# Patient Record
Sex: MALE | Race: OTHER | Employment: FULL TIME | URBAN - METROPOLITAN AREA
[De-identification: names, ages, dates, MRNs, and addresses within clinical notes are randomized per-mention and may not be internally consistent; named-entity substitution may affect disease eponyms.]

---

## 2017-02-04 ENCOUNTER — APPOINTMENT (OUTPATIENT)
Dept: OCCUPATIONAL MEDICINE | Age: 32
End: 2017-02-04
Attending: EMERGENCY MEDICINE

## 2019-10-31 ENCOUNTER — HOSPITAL ENCOUNTER (OUTPATIENT)
Age: 34
Discharge: HOME | End: 2019-10-31
Attending: EMERGENCY MEDICINE
Payer: COMMERCIAL

## 2019-10-31 VITALS
OXYGEN SATURATION: 100 % | HEART RATE: 98 BPM | DIASTOLIC BLOOD PRESSURE: 74 MMHG | RESPIRATION RATE: 18 BRPM | TEMPERATURE: 98 F | WEIGHT: 310 LBS | SYSTOLIC BLOOD PRESSURE: 138 MMHG

## 2019-10-31 DIAGNOSIS — G51.0 BELL'S PALSY: Primary | ICD-10-CM

## 2019-10-31 PROCEDURE — 99204 OFFICE O/P NEW MOD 45 MIN: CPT

## 2019-10-31 PROCEDURE — 36415 COLL VENOUS BLD VENIPUNCTURE: CPT

## 2019-10-31 PROCEDURE — 99203 OFFICE O/P NEW LOW 30 MIN: CPT

## 2019-10-31 PROCEDURE — 86618 LYME DISEASE ANTIBODY: CPT | Performed by: EMERGENCY MEDICINE

## 2019-10-31 RX ORDER — VALACYCLOVIR HYDROCHLORIDE 1 G/1
1 TABLET, FILM COATED ORAL 3 TIMES DAILY
Qty: 21 TABLET | Refills: 0 | Status: SHIPPED | OUTPATIENT
Start: 2019-10-31 | End: 2019-11-07

## 2019-10-31 RX ORDER — PREDNISONE 10 MG/1
TABLET ORAL
Qty: 45 TABLET | Refills: 0 | Status: SHIPPED | OUTPATIENT
Start: 2019-10-31

## 2019-10-31 NOTE — ED PROVIDER NOTES
[facility]      History   Patient presents with:  Numbness Weakness (neurologic)    Stated Complaint: right side going numb    HPI    Patient is a 28-year-old male without significant past medical history chest  No bruits  CV: Regular rate and rhythm no murmur  Respiratory: Clear to auscultation bilaterally  Skin: No acute rash  Neuro: Alert and oriented x3   cranial nerves:  There is a right-sided facial droop involving the forehead  Strength 5/5 bilaterall

## 2019-10-31 NOTE — ED INITIAL ASSESSMENT (HPI)
r sided facial numbness since this am, speech clear, no facial droop, equal hand grasps , no drooling, no fever

## 2019-11-06 ENCOUNTER — OFFICE VISIT (OUTPATIENT)
Dept: FAMILY MEDICINE CLINIC | Facility: CLINIC | Age: 34
End: 2019-11-06

## 2019-11-06 VITALS
BODY MASS INDEX: 47.74 KG/M2 | TEMPERATURE: 98 F | SYSTOLIC BLOOD PRESSURE: 117 MMHG | DIASTOLIC BLOOD PRESSURE: 75 MMHG | HEIGHT: 68 IN | HEART RATE: 79 BPM | WEIGHT: 315 LBS

## 2019-11-06 DIAGNOSIS — G51.0 BELL'S PALSY: Primary | ICD-10-CM

## 2019-11-06 PROCEDURE — 99203 OFFICE O/P NEW LOW 30 MIN: CPT | Performed by: FAMILY MEDICINE

## 2019-11-06 NOTE — PROGRESS NOTES
11/6/2019  12:59 PM    Jorge Ryan is a 30year old male. Chief complaint(s): Patient presents with:  Urgent Care F/u: f/u on bells palsy  x1 week, right watery eye, diffiulty eating and tlking at times.  Has been doing home massages     HPI: Psychiatric/Behavioral: Negative for depressed mood. PHYSICAL EXAM:   Physical Exam    Constitutional: He appears well-developed and well-nourished.    /75 (BP Location: Right arm, Patient Position: Sitting, Cuff Size: adult)   Pulse 79   Temp

## 2019-11-13 ENCOUNTER — TELEPHONE (OUTPATIENT)
Dept: PHYSICAL THERAPY | Facility: HOSPITAL | Age: 34
End: 2019-11-13

## 2019-11-21 ENCOUNTER — OFFICE VISIT (OUTPATIENT)
Dept: PHYSICAL THERAPY | Facility: HOSPITAL | Age: 34
End: 2019-11-21
Attending: FAMILY MEDICINE
Payer: COMMERCIAL

## 2019-11-21 PROCEDURE — 97112 NEUROMUSCULAR REEDUCATION: CPT

## 2019-11-21 PROCEDURE — 97161 PT EVAL LOW COMPLEX 20 MIN: CPT

## 2019-11-21 NOTE — PROGRESS NOTES
NEUROLOGICAL PHYSICAL THERAPY EVALUATION:   Referring Physician: Dr. Yas Greer  Diagnosis: Bell's Palsy      Date of Onset: per HPI Date of Service: 11/21/2019     PATIENT SUMMARY   Jorge Louis is a 30year old y/o male who presents to therapy t eyebrows  __10____  Bring eyebrows down and together __90__  Gently close eyes___95__  Squint your lower eyelids up_10___  Close eyes tightly__60__  Smile evenly with your lips together_trace___  Big wide smile with lips apart_trace___  Raise upper lip whi Cj PT, NCS      Electronically signed by therapist: Chester Hamilton PT, TIMOTHY    [de-identified] certification required: Yes  I certify the need for these services furnished under this plan of treatment and while under my care.     X_______________________

## 2019-11-25 ENCOUNTER — OFFICE VISIT (OUTPATIENT)
Dept: PHYSICAL THERAPY | Facility: HOSPITAL | Age: 34
End: 2019-11-25
Attending: FAMILY MEDICINE
Payer: COMMERCIAL

## 2019-11-25 PROCEDURE — 97112 NEUROMUSCULAR REEDUCATION: CPT

## 2019-11-25 NOTE — PROGRESS NOTES
Diagnosis: Bell's Palsy  Visit (# authorized):                          Referring Physician: Dr. Nena Stallings    Precautions: none    Medication changes since last visit?:  No    Subjective: Pt reports noting improvements in speech. Mild improvement in smile.

## 2019-12-10 ENCOUNTER — OFFICE VISIT (OUTPATIENT)
Dept: PHYSICAL THERAPY | Facility: HOSPITAL | Age: 34
End: 2019-12-10
Attending: FAMILY MEDICINE
Payer: COMMERCIAL

## 2019-12-10 DIAGNOSIS — G51.0 BELL'S PALSY: ICD-10-CM

## 2019-12-10 PROCEDURE — 97112 NEUROMUSCULAR REEDUCATION: CPT

## 2019-12-10 NOTE — PROGRESS NOTES
Diagnosis: Bell's Palsy  Visit (# authorized):   6 per POC [facility])                       Referring Physician: Dr. Gemma Russo    Precautions: none    Medication changes since last visit?:  No    Subjective: Improvements in \"P\" sound and squinting.  Tanner

## 2019-12-17 ENCOUNTER — APPOINTMENT (OUTPATIENT)
Dept: PHYSICAL THERAPY | Facility: HOSPITAL | Age: 34
End: 2019-12-17
Attending: FAMILY MEDICINE
Payer: COMMERCIAL

## 2020-01-02 ENCOUNTER — OFFICE VISIT (OUTPATIENT)
Dept: PHYSICAL THERAPY | Facility: HOSPITAL | Age: 35
End: 2020-01-02
Attending: FAMILY MEDICINE
Payer: COMMERCIAL

## 2020-01-02 DIAGNOSIS — G51.0 BELL'S PALSY: ICD-10-CM

## 2020-01-02 PROCEDURE — 97112 NEUROMUSCULAR REEDUCATION: CPT

## 2020-01-02 NOTE — PROGRESS NOTES
Discharge Summary    Referring Physician: Dr. Tadeo Wen    Diagnosis: Cassandra Hartmann has attended 4 visits in Physical Therapy. Subjective: Pt reports minimal tightness over top lip noted with smiling primarily.  Weakness also experienced with swishi options and has agreed to actively participate in planning and for this course of care. Thank you for your referral. If you have any questions, please contact me at Dept: [phone].     Sincerely,    Shama Hunter PT, NCS    Electronically signed by

## 2020-01-07 ENCOUNTER — APPOINTMENT (OUTPATIENT)
Dept: PHYSICAL THERAPY | Facility: HOSPITAL | Age: 35
End: 2020-01-07
Attending: FAMILY MEDICINE
Payer: COMMERCIAL

## 2020-01-14 ENCOUNTER — APPOINTMENT (OUTPATIENT)
Dept: PHYSICAL THERAPY | Facility: HOSPITAL | Age: 35
End: 2020-01-14
Attending: FAMILY MEDICINE
Payer: COMMERCIAL

## 2020-01-21 ENCOUNTER — APPOINTMENT (OUTPATIENT)
Dept: PHYSICAL THERAPY | Facility: HOSPITAL | Age: 35
End: 2020-01-21
Attending: FAMILY MEDICINE
Payer: COMMERCIAL

## 2020-01-28 ENCOUNTER — APPOINTMENT (OUTPATIENT)
Dept: PHYSICAL THERAPY | Facility: HOSPITAL | Age: 35
End: 2020-01-28
Attending: FAMILY MEDICINE
Payer: COMMERCIAL

## 2020-07-23 ENCOUNTER — PATIENT MESSAGE (OUTPATIENT)
Dept: FAMILY MEDICINE CLINIC | Facility: CLINIC | Age: 35
End: 2020-07-23

## 2020-07-23 ENCOUNTER — NURSE TRIAGE (OUTPATIENT)
Dept: FAMILY MEDICINE CLINIC | Facility: CLINIC | Age: 35
End: 2020-07-23

## 2020-07-23 NOTE — TELEPHONE ENCOUNTER
Action Requested: Summary for Provider     []  Critical Lab, Recommendations Needed  [] Need Additional Advice  []   FYI    []   Need Orders  [] Need Medications Sent to Pharmacy  []  Other     SUMMARY:     Spoke to patient.  He denies close contact with in

## 2020-07-23 NOTE — TELEPHONE ENCOUNTER
Lisbeth Álvarez MD 58 minutes ago (1:38 PM)         Hi Doctor. My names Mark Jameson, and I was suppose to meet with you earlier last year.  About my bells palsy.  I think at the time you weren't available so a different docto

## 2020-07-23 NOTE — TELEPHONE ENCOUNTER
From: Josette Rowan  To: Rona Lakhani MD  Sent: 7/23/2020 1:38 PM CDT  Subject: Other    Hi Doctor. My names Diandra Beyer, and I was suppose to meet with you earlier last year. About my bells palsy.  I think at the time you weren't availabl

## 2020-07-24 ENCOUNTER — LAB ENCOUNTER (OUTPATIENT)
Dept: LAB | Facility: HOSPITAL | Age: 35
End: 2020-07-24
Attending: FAMILY MEDICINE
Payer: COMMERCIAL

## 2020-07-24 ENCOUNTER — TELEMEDICINE (OUTPATIENT)
Dept: FAMILY MEDICINE CLINIC | Facility: CLINIC | Age: 35
End: 2020-07-24

## 2020-07-24 DIAGNOSIS — Z20.822 SUSPECTED COVID-19 VIRUS INFECTION: Primary | ICD-10-CM

## 2020-07-24 DIAGNOSIS — Z20.822 SUSPECTED COVID-19 VIRUS INFECTION: ICD-10-CM

## 2020-07-24 PROCEDURE — 99213 OFFICE O/P EST LOW 20 MIN: CPT | Performed by: FAMILY MEDICINE

## 2020-07-24 NOTE — PROGRESS NOTES
Virtual Telephone Check-In    Jorge Desai verbally consents to a Virtual/Telephone Check-In service on 07/24/20.     Patient understands and accepts financial responsibility for any deductible, co-insurance and/or co-pays associated with this ser

## 2020-07-25 LAB — SARS-COV-2 RNA RESP QL NAA+PROBE: NOT DETECTED

## 2020-12-21 ENCOUNTER — PATIENT MESSAGE (OUTPATIENT)
Dept: FAMILY MEDICINE CLINIC | Facility: CLINIC | Age: 35
End: 2020-12-21

## 2020-12-21 DIAGNOSIS — Z20.822 SUSPECTED COVID-19 VIRUS INFECTION: Primary | ICD-10-CM

## 2020-12-21 NOTE — TELEPHONE ENCOUNTER
Dr Reza Lung below, pended [facility]. From: Jorge Christianson  To: Rosaura Boo MD  Sent: 12/21/2020  9:53 AM CST  Subject: Referral Request    Hi Dr. Yoana Rick.       Hope you and your family are doing well, quick question, is there is any chance I coul

## 2020-12-22 ENCOUNTER — LAB ENCOUNTER (OUTPATIENT)
Dept: LAB | Age: 35
End: 2020-12-22
Attending: FAMILY MEDICINE
Payer: COMMERCIAL

## 2020-12-22 DIAGNOSIS — Z20.822 SUSPECTED COVID-19 VIRUS INFECTION: ICD-10-CM

## 2021-04-01 ENCOUNTER — OFFICE VISIT (OUTPATIENT)
Dept: FAMILY MEDICINE CLINIC | Facility: CLINIC | Age: 36
End: 2021-04-01

## 2021-04-01 VITALS
HEIGHT: 68 IN | DIASTOLIC BLOOD PRESSURE: 84 MMHG | WEIGHT: 315 LBS | BODY MASS INDEX: 47.74 KG/M2 | HEART RATE: 90 BPM | TEMPERATURE: 98 F | SYSTOLIC BLOOD PRESSURE: 152 MMHG

## 2021-04-01 DIAGNOSIS — R03.0 ELEVATED BLOOD PRESSURE READING: ICD-10-CM

## 2021-04-01 DIAGNOSIS — Z00.00 ANNUAL PHYSICAL EXAM: Primary | ICD-10-CM

## 2021-04-01 PROCEDURE — 3008F BODY MASS INDEX DOCD: CPT | Performed by: FAMILY MEDICINE

## 2021-04-01 PROCEDURE — 99395 PREV VISIT EST AGE 18-39: CPT | Performed by: FAMILY MEDICINE

## 2021-04-01 PROCEDURE — 3077F SYST BP >= 140 MM HG: CPT | Performed by: FAMILY MEDICINE

## 2021-04-01 PROCEDURE — 3079F DIAST BP 80-89 MM HG: CPT | Performed by: FAMILY MEDICINE

## 2021-04-01 NOTE — PROGRESS NOTES
Denece Orf is a 37year old male who presents for a complete physical exam.   HPI:   Work: Working in Customer service and as a realtor.    Social: Lives with wife and kids   Diet: eats home cooked meals and take-out food usually fast food when Temp 97.9 °F (36.6 °C) (Tympanic)   Ht 5' 8\" (1.727 m)   Wt (!) 332 lb (150.6 kg)   BMI 50.48 kg/m²     GENERAL: well developed, well nourished, in no apparent distress  SKIN: no rashes, no suspicious lesions  HEENT: atraumatic, normocephalic, ears with

## 2021-04-05 ENCOUNTER — LAB ENCOUNTER (OUTPATIENT)
Dept: LAB | Age: 36
End: 2021-04-05
Attending: FAMILY MEDICINE
Payer: COMMERCIAL

## 2021-04-05 DIAGNOSIS — Z00.00 ANNUAL PHYSICAL EXAM: ICD-10-CM

## 2021-04-05 PROCEDURE — 85025 COMPLETE CBC W/AUTO DIFF WBC: CPT

## 2021-04-05 PROCEDURE — 36415 COLL VENOUS BLD VENIPUNCTURE: CPT

## 2021-04-05 PROCEDURE — 83036 HEMOGLOBIN GLYCOSYLATED A1C: CPT

## 2021-04-05 PROCEDURE — 84443 ASSAY THYROID STIM HORMONE: CPT

## 2021-04-05 PROCEDURE — 80061 LIPID PANEL: CPT

## 2021-04-05 PROCEDURE — 80053 COMPREHEN METABOLIC PANEL: CPT

## 2021-04-05 PROCEDURE — 84439 ASSAY OF FREE THYROXINE: CPT

## 2021-04-05 PROCEDURE — 82306 VITAMIN D 25 HYDROXY: CPT

## 2021-04-09 DIAGNOSIS — Z23 NEED FOR VACCINATION: ICD-10-CM

## 2021-04-12 PROBLEM — E78.1 HYPERTRIGLYCERIDEMIA: Status: ACTIVE | Noted: 2021-04-12

## 2021-04-12 PROBLEM — E55.9 VITAMIN D DEFICIENCY: Status: ACTIVE | Noted: 2021-04-12

## 2021-04-12 PROBLEM — R74.01 ELEVATED TRANSAMINASE LEVEL: Status: ACTIVE | Noted: 2021-04-12

## 2021-04-12 PROBLEM — R79.89 ELEVATED TSH: Status: ACTIVE | Noted: 2021-04-12

## 2021-04-12 PROBLEM — E11.65 TYPE 2 DIABETES MELLITUS WITH HYPERGLYCEMIA, WITHOUT LONG-TERM CURRENT USE OF INSULIN (HCC): Status: ACTIVE | Noted: 2021-04-12

## 2023-02-01 ENCOUNTER — TELEPHONE (OUTPATIENT)
Dept: FAMILY MEDICINE CLINIC | Facility: CLINIC | Age: 38
End: 2023-02-01

## 2023-04-03 ENCOUNTER — TELEPHONE (OUTPATIENT)
Dept: FAMILY MEDICINE CLINIC | Facility: CLINIC | Age: 38
End: 2023-04-03

## 2023-04-03 NOTE — TELEPHONE ENCOUNTER
Patient sees Dr. Mala Howard as PCP, but Dr. Leon Varela is listed as PCP. Please advise on eligibility for PCP change. No active insurance listed.

## 2023-04-04 ENCOUNTER — PATIENT OUTREACH (OUTPATIENT)
Dept: CASE MANAGEMENT | Age: 38
End: 2023-04-04

## 2023-04-04 NOTE — PROCEDURES
The office order for PCP removal request is Approved and finalized on April 4, 2023. [facility] patient assigned to Dr. Amanda Rae. Updated PCP to reflect assigned [facility] PCP.     Thanks,  [facility] [employer] Cimetidine Counseling:  I discussed with the patient the risks of Cimetidine including but not limited to gynecomastia, headache, diarrhea, nausea, drowsiness, arrhythmias, pancreatitis, skin rashes, psychosis, bone marrow suppression and kidney toxicity.

## 2023-07-26 ENCOUNTER — TELEPHONE (OUTPATIENT)
Dept: FAMILY MEDICINE CLINIC | Facility: CLINIC | Age: 38
End: 2023-07-26

## 2023-11-02 ENCOUNTER — PATIENT OUTREACH (OUTPATIENT)
Dept: CASE MANAGEMENT | Age: 38
End: 2023-11-02

## 2023-11-02 NOTE — PROCEDURES
Received order requesting to update PCP to Dr. Keesha Andre is Denied and finalized on November 2, 2023. [place] patient is listed on the [facility]:  [place] Attributed PCP is Dr. Adrienne Fowler, please contact patient to verify PCP. Inform patient they must contact [facility] Iglesias to provide the name of current PCP. [facility] can change their PCP on the [facility] website, by emailing [email or web address]   [facility] Members can call Brian Iglesias customer service at [phone]  [facility] PCP assignment is handled by Brian Iglesias, bruna [place] should also mail patient a letter to contact office to schedule an appointment or contact Brian Iglesias to update their PCP.     After office has verified patient is no longer listed on the [place] [facility] monthly eligibility list and not assigned to Dr. Igor Trent then office must resubmit a new order for PCP removal request.      Thanks,  [id number] [facility]

## (undated) NOTE — LETTER
12/23/2020          To Whom It May Concern:    West[place] Cedillo is currently under my medical care and may not return to work at this time. Please excuse Jorge for 10 days.   He may return to work on 12/31/20, as long as he has been symptom free fo